# Patient Record
Sex: FEMALE | Race: WHITE | NOT HISPANIC OR LATINO | Employment: STUDENT | URBAN - METROPOLITAN AREA
[De-identification: names, ages, dates, MRNs, and addresses within clinical notes are randomized per-mention and may not be internally consistent; named-entity substitution may affect disease eponyms.]

---
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
## 2023-08-03 ENCOUNTER — OFFICE VISIT (OUTPATIENT)
Dept: INTERNAL MEDICINE | Facility: CLINIC | Age: 18
End: 2023-08-03
Payer: COMMERCIAL

## 2023-08-03 VITALS
OXYGEN SATURATION: 98 % | RESPIRATION RATE: 16 BRPM | HEIGHT: 68 IN | BODY MASS INDEX: 22.88 KG/M2 | SYSTOLIC BLOOD PRESSURE: 106 MMHG | DIASTOLIC BLOOD PRESSURE: 60 MMHG | WEIGHT: 151 LBS | TEMPERATURE: 97.5 F | HEART RATE: 72 BPM

## 2023-08-03 DIAGNOSIS — N92.1 MENORRHAGIA WITH IRREGULAR CYCLE: ICD-10-CM

## 2023-08-03 DIAGNOSIS — Z00.00 ROUTINE GENERAL MEDICAL EXAMINATION AT A HEALTH CARE FACILITY: Primary | ICD-10-CM

## 2023-10-17 ENCOUNTER — TELEPHONE (OUTPATIENT)
Dept: INTERNAL MEDICINE | Facility: CLINIC | Age: 18
End: 2023-10-17
Payer: COMMERCIAL

## 2023-10-17 NOTE — TELEPHONE ENCOUNTER
Patients provider will be off boarding on 12/1/23. Called patient to reschedule 8/5/24 appointment with another provider in the office. No answer. Left message on machine.     Hub to read.

## 2024-07-15 DIAGNOSIS — N92.1 MENORRHAGIA WITH IRREGULAR CYCLE: ICD-10-CM

## 2024-07-15 NOTE — TELEPHONE ENCOUNTER
Caller: Haily Mancera    Relationship: Self    Best call back number: [phone]     Requested Prescriptions:   Requested Prescriptions     Pending Prescriptions Disp Refills    norgestrel-ethinyl estradiol (LOW-OGESTREL,CRYSELLE) 0.3-30 MG-MCG per tablet 84 tablet 3     Sig: Take 1 tablet by mouth Daily.        Pharmacy where request should be sent: [facility] [address] [phone] [facility] [phone]      Last office visit with prescribing clinician: Visit date not found   Last telemedicine visit with prescribing clinician: Visit date not found   Next office visit with prescribing clinician: 8/5/2024         Does the patient have less than a 3 day supply:  [] Yes  [x] No    Would you like a call back once the refill request has been completed: [] Yes [] No    If the office needs to give you a call back, can they leave a voicemail: [] Yes [] No    Berry Ruiz Rep   07/15/24 13:52 EDT

## 2024-08-05 ENCOUNTER — OFFICE VISIT (OUTPATIENT)
Dept: INTERNAL MEDICINE | Facility: CLINIC | Age: 19
End: 2024-08-05
Payer: COMMERCIAL

## 2024-08-05 VITALS
WEIGHT: 149.8 LBS | OXYGEN SATURATION: 98 % | HEART RATE: 101 BPM | DIASTOLIC BLOOD PRESSURE: 70 MMHG | RESPIRATION RATE: 16 BRPM | SYSTOLIC BLOOD PRESSURE: 116 MMHG | HEIGHT: 68 IN | BODY MASS INDEX: 22.7 KG/M2

## 2024-08-05 DIAGNOSIS — Z92.29 HISTORY OF TETANUS, DIPHTHERIA, AND ACELLULAR PERTUSSIS BOOSTER VACCINATION (TDAP): ICD-10-CM

## 2024-08-05 DIAGNOSIS — Z00.00 ENCOUNTER FOR ROUTINE ADULT HEALTH EXAMINATION WITHOUT ABNORMAL FINDINGS: Primary | ICD-10-CM

## 2024-08-05 PROCEDURE — 90471 IMMUNIZATION ADMIN: CPT | Performed by: INTERNAL MEDICINE

## 2024-08-05 PROCEDURE — 90715 TDAP VACCINE 7 YRS/> IM: CPT | Performed by: INTERNAL MEDICINE

## 2024-08-05 PROCEDURE — 99395 PREV VISIT EST AGE 18-39: CPT | Performed by: INTERNAL MEDICINE

## 2024-08-05 NOTE — PROGRESS NOTES
"Chief Complaint   Patient presents with    \[facility]\"" Care     Previous Dr. Aguirre patient    Annual Exam     Wellness Exam       Subjective   Haily Mancera is a 19 y.o. female here for   Chief Complaint   Patient presents with    [facility]     Previous Dr. Aguirre patient    Annual Exam     Wellness Exam   .    History of Present Illness     The following portions of the patient's history were reviewed and updated as appropriate: allergies, current medications, past family history, past medical history, past social history, past surgical history, and problem list.  Here for the physical today.  Overall doing very well.  No specific concerns.  We talked about diet and exercise habits.  Discussed prevention recommendations.     Review of Systems   All other systems reviewed and are negative.      Body mass index is 22.78 kg/m².   Vitals:    08/05/24 0957   BP: 116/70   Pulse: 101   Resp: 16   SpO2: 98%   Weight: 67.9 kg (149 lb 12.8 oz)   Height: 172.7 cm (68\")        Objective   Physical Exam  Vitals and nursing note reviewed.   Constitutional:       General: She is not in acute distress.     Appearance: Normal appearance.   HENT:      Head: Normocephalic and atraumatic.      Right Ear: Tympanic membrane and ear canal normal.      Left Ear: Tympanic membrane and ear canal normal.      Nose: Nose normal. No congestion.      Mouth/Throat:      Mouth: Mucous membranes are moist.      Pharynx: No oropharyngeal exudate or posterior oropharyngeal erythema.   Eyes:      Extraocular Movements: Extraocular movements intact.      Conjunctiva/sclera: Conjunctivae normal.      Pupils: Pupils are equal, round, and reactive to light.   Cardiovascular:      Rate and Rhythm: Normal rate and regular rhythm.      Pulses: Normal pulses.      Heart sounds: Normal heart sounds. No murmur heard.  Pulmonary:      Effort: Pulmonary effort is normal. No respiratory distress.      Breath sounds: Normal breath sounds. No wheezing or " "rales.   Abdominal:      General: Abdomen is flat. Bowel sounds are normal. There is no distension.      Palpations: Abdomen is soft.      Tenderness: There is no abdominal tenderness.   Musculoskeletal:      Cervical back: Normal range of motion and neck supple. No rigidity.   Lymphadenopathy:      Cervical: No cervical adenopathy.   Skin:     General: Skin is warm.      Capillary Refill: Capillary refill takes less than 2 seconds.   Neurological:      General: No focal deficit present.      Mental Status: She is alert and oriented to person, place, and time. Mental status is at baseline.      Motor: No weakness.   Psychiatric:         Mood and Affect: Mood normal.         Behavior: Behavior normal.         Thought Content: Thought content normal.       No results found for: \"CBCDIF\", \"CMP\", \"LIPIDINTERP\", \"HGBA1C\", \"TSH\", \"BVFB71KH\", \"PSA\", \"TESTOSTERONE\"     Health Maintenance   Topic Date Due    HEPATITIS C SCREENING  Never done    PAP SMEAR  Never done    COVID-19 Vaccine (4 - 2023-24 season) 09/01/2023    TDAP/TD VACCINES (2 - Td or Tdap) 07/11/2024    INFLUENZA VACCINE  08/01/2024    ANNUAL PHYSICAL  08/05/2025    Pneumococcal Vaccine 0-64  Completed    MENINGOCOCCAL VACCINE  Completed    HPV VACCINES  Completed    CHLAMYDIA SCREENING  Discontinued        Assessment & Plan   Problems Addressed this Visit    None  Visit Diagnoses       Encounter for routine adult health examination without abnormal findings    -  Primary    History of tetanus, diphtheria, and acellular pertussis booster vaccination (Tdap)        Relevant Orders    Tdap Vaccine => 8yo IM (BOOSTRIX/ADACEL)          Diagnoses         Codes Comments    Encounter for routine adult health examination without abnormal findings    -  Primary ICD-10-CM: Z00.00  ICD-9-CM: V70.0     History of tetanus, diphtheria, and acellular pertussis booster vaccination (Tdap)     ICD-10-CM: Z92.29  ICD-9-CM: V45.89             Orders Placed This Encounter "   Procedures    Tdap Vaccine => 6yo IM (BOOSTRIX/ADACEL)      Annual exam- school form completed, 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 Update Tdap today, discussed labs if she wants at some point. Gynecology referral for Pap, she will let me know if she needs.    Jesus Coughlin MD  [facility]  [address]  Phone: [phone]

## 2025-06-05 NOTE — TELEPHONE ENCOUNTER
Rx Refill Note  Requested Prescriptions     Pending Prescriptions Disp Refills    norgestrel-ethinyl estradiol (LOW-OGESTREL,CRYSELLE) 0.3-30 MG-MCG per tablet [Pharmacy Med Name: [facility]GEST/E ES(TURQOZ)TAB PK28 0.3MG/30MCG] 84 tablet 3     Sig: TAKE 1 TABLET DAILY      Last office visit with prescribing clinician: 8/5/2024   Last telemedicine visit with prescribing clinician: Visit date not found   Next office visit with prescribing clinician: 8/6/2025                         Would you like a call back once the refill request has been completed: [] Yes [] No    If the office needs to give you a call back, can they leave a voicemail: [] Yes [] No    Sanam Gold MA  06/05/25, 07:40 EDT